# Patient Record
Sex: MALE | URBAN - METROPOLITAN AREA
[De-identification: names, ages, dates, MRNs, and addresses within clinical notes are randomized per-mention and may not be internally consistent; named-entity substitution may affect disease eponyms.]

---

## 2020-08-25 ENCOUNTER — APPOINTMENT (RX ONLY)
Dept: URBAN - METROPOLITAN AREA CLINIC 38 | Facility: CLINIC | Age: 35
Setting detail: DERMATOLOGY
End: 2020-08-25

## 2020-08-25 ENCOUNTER — APPOINTMENT (RX ONLY)
Dept: URBAN - METROPOLITAN AREA CLINIC 37 | Facility: CLINIC | Age: 35
Setting detail: DERMATOLOGY
End: 2020-08-25

## 2020-08-25 DIAGNOSIS — L738 OTHER SPECIFIED DISEASES OF HAIR AND HAIR FOLLICLES: ICD-10-CM

## 2020-08-25 DIAGNOSIS — L663 OTHER SPECIFIED DISEASES OF HAIR AND HAIR FOLLICLES: ICD-10-CM

## 2020-08-25 DIAGNOSIS — L73.9 FOLLICULAR DISORDER, UNSPECIFIED: ICD-10-CM

## 2020-08-25 PROBLEM — L02.421 FURUNCLE OF RIGHT AXILLA: Status: ACTIVE | Noted: 2020-08-25

## 2020-08-25 PROBLEM — D23.62 OTHER BENIGN NEOPLASM OF SKIN OF LEFT UPPER LIMB, INCLUDING SHOULDER: Status: ACTIVE | Noted: 2020-08-25

## 2020-08-25 PROBLEM — L02.422 FURUNCLE OF LEFT AXILLA: Status: ACTIVE | Noted: 2020-08-25

## 2020-08-25 PROCEDURE — ? PRESCRIPTION

## 2020-08-25 PROCEDURE — 99202 OFFICE O/P NEW SF 15 MIN: CPT

## 2020-08-25 PROCEDURE — ? TREATMENT REGIMEN

## 2020-08-25 PROCEDURE — ? ORDER TESTS

## 2020-08-25 PROCEDURE — ? COUNSELING

## 2020-08-25 RX ORDER — DOXYCYCLINE HYCLATE 100 MG/1
TABLET, COATED ORAL BID
Qty: 42 | Refills: 0 | Status: ERX | COMMUNITY
Start: 2020-08-25

## 2020-08-25 RX ADMIN — DOXYCYCLINE HYCLATE 1: 100 TABLET, COATED ORAL at 00:00

## 2020-08-25 ASSESSMENT — LOCATION SIMPLE DESCRIPTION DERM
LOCATION SIMPLE: RIGHT AXILLARY VAULT
LOCATION SIMPLE: RIGHT AXILLARY VAULT
LOCATION SIMPLE: LEFT AXILLARY VAULT
LOCATION SIMPLE: LEFT AXILLARY VAULT

## 2020-08-25 ASSESSMENT — LOCATION DETAILED DESCRIPTION DERM
LOCATION DETAILED: LEFT AXILLARY VAULT
LOCATION DETAILED: RIGHT AXILLARY VAULT
LOCATION DETAILED: LEFT AXILLARY VAULT
LOCATION DETAILED: RIGHT AXILLARY VAULT

## 2020-08-25 ASSESSMENT — LOCATION ZONE DERM
LOCATION ZONE: AXILLAE
LOCATION ZONE: AXILLAE

## 2020-08-25 NOTE — PROCEDURE: ORDER TESTS
Expected Date Of Service: 08/25/2020
Lab Facility: [id number]
Performing Laboratory: [id number]
Bill For Surgical Tray: no
Billing Type: Third-Party Bill

## 2020-08-25 NOTE — PROCEDURE: COUNSELING
Patient Specific Counseling (Will Not Stick From Patient To Patient): The lesions are on the outer parts of the axilla - not the central axilla - most likely due to friction/rubbing - since this is frequent, will culture his anterior nares and see if he is a staph carrier.  Will treat with extended doxycycline and suggested trying some astroglide to reduce friction in the area.
Detail Level: Zone
Detail Level: Simple

## 2020-08-25 NOTE — PROCEDURE: TREATMENT REGIMEN
Detail Level: Zone
Initiate Treatment: Doxycycline 100mg twice a day x 2 weeks then once a day x 2weeks

## 2020-08-25 NOTE — PROCEDURE: COUNSELING
Detail Level: Zone
Patient Specific Counseling (Will Not Stick From Patient To Patient): The lesions are on the outer parts of the axilla - not the central axilla - most likely due to friction/rubbing - since this is frequent, will culture his anterior nares and see if he is a staph carrier.  Will treat with extended doxycycline and suggested trying some astroglide to reduce friction in the area.
Detail Level: Simple

## 2020-08-31 ENCOUNTER — RX ONLY (OUTPATIENT)
Age: 35
Setting detail: RX ONLY
End: 2020-08-31

## 2020-08-31 RX ORDER — MUPIROCIN 20 MG/G
1 OINTMENT TOPICAL BID
Qty: 1 | Refills: 0 | Status: ERX | COMMUNITY
Start: 2020-08-31

## 2023-11-06 NOTE — HPI: SKIN LESION
How Severe Is Your Skin Lesion?: moderate
Has Your Skin Lesion Been Treated?: not been treated
Is This A New Presentation, Or A Follow-Up?: Skin Lesions
Special Stains Used Stage 1: Mart-1